# Patient Record
Sex: MALE | Race: WHITE | NOT HISPANIC OR LATINO | Employment: FULL TIME | URBAN - METROPOLITAN AREA
[De-identification: names, ages, dates, MRNs, and addresses within clinical notes are randomized per-mention and may not be internally consistent; named-entity substitution may affect disease eponyms.]

---

## 2020-07-08 ENCOUNTER — OFFICE VISIT (OUTPATIENT)
Dept: ORTHOPEDIC SURGERY | Facility: CLINIC | Age: 50
End: 2020-07-08

## 2020-07-08 VITALS — WEIGHT: 173 LBS | OXYGEN SATURATION: 98 % | HEART RATE: 60 BPM | HEIGHT: 71 IN | BODY MASS INDEX: 24.22 KG/M2

## 2020-07-08 DIAGNOSIS — M25.512 LEFT SHOULDER PAIN, UNSPECIFIED CHRONICITY: Primary | ICD-10-CM

## 2020-07-08 DIAGNOSIS — Z02.6 ENCOUNTER RELATED TO WORKER'S COMPENSATION CLAIM: ICD-10-CM

## 2020-07-08 DIAGNOSIS — S46.012A RUPTURE OF LEFT SUPRASPINATUS TENDON, INITIAL ENCOUNTER: ICD-10-CM

## 2020-07-08 PROCEDURE — 99204 OFFICE O/P NEW MOD 45 MIN: CPT | Performed by: ORTHOPAEDIC SURGERY

## 2020-07-08 NOTE — PROGRESS NOTES
[facility] [place] [facility] [place]    Subjective     Chief Complaint   Patient presents with   • Left Shoulder - Pain        HPI    Rolando Jung is a 50 y.o. male who presents with left shoulder pain.  Onset: atraumatic and gradual in nature. The issue has been ongoing for 2 month(s). Pain is a 6/10 on the pain scale. Pain is described as aching. Associated symptoms include pain, popping and grinding. The pain is worse with sleeping, working and leisure; resting improve the pain. Previous treatments have included: physical therapy.    I have reviewed the following portions of the patient's history:History of Present Illness  He has been doing physical therapy in [place] since May 7 at [facility].  He is not getting better.  His physical therapist stopped until he got the MRI.      History reviewed. No pertinent past medical history.   Past Surgical History:   Procedure Laterality Date   • APPENDECTOMY        Family History   Problem Relation Age of Onset   • Cancer Mother    • Diabetes Mother    • Cancer Father      Social History     Socioeconomic History   • Marital status:      Spouse name: Not on file   • Number of children: Not on file   • Years of education: Not on file   • Highest education level: Not on file   Tobacco Use   • Smoking status: Former Smoker     Types: Cigarettes     Start date:      Last attempt to quit:      Years since quittin.5   • Smokeless tobacco: Former User   Substance and Sexual Activity   • Alcohol use: Never     Frequency: Never   • Drug use: Never   • Sexual activity: Defer      No current outpatient medications on file prior to visit.     No current facility-administered medications on file prior to visit.       Allergies   Allergen Reactions   • Penicillins Hives        The following portions of the patient's history were reviewed and updated as appropriate: allergies, current medications, past family history, past medical history, past  "social history, past surgical history and problem list.    Review of Systems   Constitutional: Negative.    HENT: Negative.    Eyes: Negative.    Respiratory: Negative.    Cardiovascular: Negative.    Gastrointestinal: Negative.    Endocrine: Negative.    Genitourinary: Negative.    Musculoskeletal: Positive for arthralgias and joint swelling.   Skin: Negative.    Allergic/Immunologic: Negative.    Neurological: Negative.    Hematological: Negative.    Psychiatric/Behavioral: Negative.         Objective      Physical Exam  Pulse 60   Ht 180.3 cm (71\")   Wt 78.5 kg (173 lb)   SpO2 98%   BMI 24.13 kg/m²     Body mass index is 24.13 kg/m².    GENERAL APPEARANCE: awake, alert & oriented x 3, in no acute distress and well developed, well nourished  PSYCH: normal mood and affect  LUNGS:  breathing nonlabored, no wheezing  EYES: sclera anicteric, pupils equal  CARDIOVASCULAR: palpable pulses dorsalis pedis, palpable posterior tibial bilaterally. Capillary refill less than 2 seconds  INTEGUMENTARY: skin intact, no clubbing, cyanosis  NEUROLOGIC:  Normal Sensation and reflexes       Ortho Exam  Musculoskeletal   Upper Extremity   Left Shoulder     Inspection and Palpation:     Tenderness - none     Crepitus - none    Sensation is normal    Examination reveals no ecchymosis.      Strength and Tone:    Supraspinatus, Infraspinatus - 4/5    Subscapularis - 5/5    Deltoid - 5/5   Range of Motion   Right shoulder:    Internal Rotation: ROM - T7    External Rotation: AROM - 80 degrees    Elevation through flexion: AROM - 180 degrees     Abduction - 180   Left Shoulder:    Internal Rotation: ROM - T7    External Rotation: AROM - 80 degrees    Elevation through flexion: AROM - 80 degrees     Abduction - 80 degrees   Instability   Left shoulder    Sulcus sign negative    Apprehension test negative    Oliver relocation test negative    Jerk test negative   Impingement   Left shoulder    Melendez impingement test positive    Neer " impingement test positive   Functional Testing   Left shoulder    AC crossover adduction test negative    Abdominal compression test negative    Lift-off sign negative    Speed's test negative    Ijm's test negative    Horriblower's sign negative     Imaging/Studies  Imaging Results (Last 7 Days)     Procedure Component Value Units Date/Time    XR Shoulder 2+ View Left [[id number]] Resulted:  07/08/20 1544     Updated:  07/08/20 1545    Narrative:       Left Shoulder X-Ray  Indication: Pain  AP, scapular Y, and axillary lateral views    Findings: Arthritic changes at the AC joint  No fracture    No prior studies were available for comparison.          He has an MRI report from June 23 which shows a high-grade partial-thickness cuff tear possible full-thickness tear    Assessment/Plan        ICD-10-CM ICD-9-CM   1. Left shoulder pain, unspecified chronicity M25.512 719.41   2. Rupture of left supraspinatus tendon, initial encounter S46.012A 840.6   3. Encounter related to worker's compensation claim Z02.6 V70.3       Orders Placed This Encounter   Procedures   • XR Shoulder 2+ View Left   • Ambulatory Referral to Physical Therapy      He needs to continue physical therapy.  He will bring his MRI disc for me to see the pictures next time.  If he is not better at next visit that I would recommend left shoulder arthroscopy with rotator cuff repair.  His work restrictions are no lifting left arm.  Medical Decision Making  Management Options : over-the-counter medicine and physical/occupational therapy  Data/Risk: radiology tests and independent visualization of imaging, lab tests, or EMG/NCV    Frederick Cornelius MD  07/08/20  15:54         EMR Dragon/Transcription disclaimer:  Much of this encounter note is an electronic transcription of spoken language to printed text. Electronic transcription of spoken language may permit erroneous, or at times, nonsensical words or phrases to be inadvertently transcribed. Although  I have reviewed the note for such errors, some may still exist.

## 2020-07-29 ENCOUNTER — OFFICE VISIT (OUTPATIENT)
Dept: ORTHOPEDIC SURGERY | Facility: CLINIC | Age: 50
End: 2020-07-29

## 2020-07-29 VITALS — HEART RATE: 60 BPM | WEIGHT: 173.06 LBS | OXYGEN SATURATION: 98 % | HEIGHT: 71 IN | BODY MASS INDEX: 24.23 KG/M2

## 2020-07-29 DIAGNOSIS — Z02.6 ENCOUNTER RELATED TO WORKER'S COMPENSATION CLAIM: ICD-10-CM

## 2020-07-29 DIAGNOSIS — S46.012D RUPTURE OF LEFT SUPRASPINATUS TENDON, SUBSEQUENT ENCOUNTER: Primary | ICD-10-CM

## 2020-07-29 PROCEDURE — 99214 OFFICE O/P EST MOD 30 MIN: CPT | Performed by: ORTHOPAEDIC SURGERY

## 2020-07-29 NOTE — PROGRESS NOTES
[facility] [place] [facility] [place]    Subjective     Chief Complaint   Patient presents with   • Follow-up     3 week recheck - Rupture of left supraspinatus tendon        HPI  Rolando Jung is a 50 y.o. male.  He is follow-up left shoulder Worker's Comp. shoulder injury.  It was atraumatic gradual in nature.  Started May 7, 2020.  He has been in physical therapy but not getting better.  He brought his MRI from July.    History reviewed. No pertinent past medical history.   Past Surgical History:   Procedure Laterality Date   • APPENDECTOMY        Family History   Problem Relation Age of Onset   • Cancer Mother    • Diabetes Mother    • Cancer Father      Social History     Socioeconomic History   • Marital status:      Spouse name: Not on file   • Number of children: Not on file   • Years of education: Not on file   • Highest education level: Not on file   Tobacco Use   • Smoking status: Former Smoker     Types: Cigarettes     Start date:      Last attempt to quit:      Years since quittin.5   • Smokeless tobacco: Former User   Substance and Sexual Activity   • Alcohol use: Never     Frequency: Never   • Drug use: Never   • Sexual activity: Defer      No current outpatient medications on file prior to visit.     No current facility-administered medications on file prior to visit.       Allergies   Allergen Reactions   • Penicillins Hives        The following portions of the patient's history were reviewed and updated as appropriate: allergies, current medications, past family history, past medical history, past social history, past surgical history and problem list.    Review of Systems   Constitutional: Negative.    HENT: Negative.    Eyes: Negative.    Respiratory: Negative.    Cardiovascular: Negative.    Gastrointestinal: Negative.    Endocrine: Negative.    Genitourinary: Negative.    Musculoskeletal: Positive for arthralgias.   Skin: Negative.    Allergic/Immunologic: Negative.   "  Neurological: Negative.    Hematological: Negative.    Psychiatric/Behavioral: Negative.         Objective      Physical Exam  Pulse 60   Ht 180.3 cm (70.98\")   Wt 78.5 kg (173 lb 1 oz)   SpO2 98%   BMI 24.15 kg/m²     Body mass index is 24.15 kg/m².    GENERAL APPEARANCE: awake, alert & oriented x 3, in no acute distress and well developed, well nourished  PSYCH: normal mood and affect  LUNGS:  breathing nonlabored, no wheezing  Left shoulder has limited motion with weak rotator cuff and positive impingement.  Imaging/Studies  Imaging Results (Last 7 Days)     ** No results found for the last 168 hours. **      I viewed his MRI which shows a full-thickness rotator cuff tear    Assessment/Plan        ICD-10-CM ICD-9-CM   1. Rupture of left supraspinatus tendon, subsequent encounter S46.012D V58.89     840.6   2. Encounter related to worker's compensation claim Z02.6 V70.3     The plan is for left shoulder arthroscopy with rotator cuff repair.  He has a full-thickness deficit.  He has weakness and lack of function.  He is working full duty.Treatment options and alternatives were discussed with patient.  Surgical risks include but are not limited to pain, bleeding, infection, failure to relieve symptoms, need for further procedures, recurrence of symptoms, damage to healthy adjacent structures, hardware loosening/failure, stiffness, weakness, scar, blood clots/DVT/PE, loss of limb or life. We also discussed the postoperative protocol and expected outcome although no guarantees are possible with surgery. All questions were answered; the patient would like to proceed with surgical intervention.  Medical Decision Making  Management Options : over-the-counter medicine and major surgery with risk factors  Data/Risk: independent visualization of imaging, lab tests, or EMG/NCV    Frederick Cornelius MD  07/29/20  15:35         EMR Dragon/Transcription disclaimer:  Much of this encounter note is an electronic " transcription of spoken language to printed text. Electronic transcription of spoken language may permit erroneous, or at times, nonsensical words or phrases to be inadvertently transcribed. Although I have reviewed the note for such errors, some may still exist.

## 2020-07-30 ENCOUNTER — TELEPHONE (OUTPATIENT)
Dept: ORTHOPEDIC SURGERY | Facility: CLINIC | Age: 50
End: 2020-07-30

## 2020-07-30 NOTE — TELEPHONE ENCOUNTER
MARY ELLEN IS [employer]  FOR [employer]. SHE NEEDS IN WRITING IF PATIENT IS TO BE AT WORK ON LIGHT DUTY UNTIL HIS SURGERY OR IF ANY RESTRICTION? LETTER CAN BE FAXED @ [phone]

## 2020-07-31 NOTE — TELEPHONE ENCOUNTER
I will forward this message back to Shayla to send letter.   He may work light duty with no use left arm.  Please fax a letter to [phone]

## 2020-08-11 ENCOUNTER — TELEPHONE (OUTPATIENT)
Dept: ORTHOPEDIC SURGERY | Facility: CLINIC | Age: 50
End: 2020-08-11

## 2020-08-11 NOTE — TELEPHONE ENCOUNTER
RETURNED PATIENT'S CALL AGAIN TODAY REGARDING [employer] NOT SENDING US AN AUTH YET FOR SURGERY, NO ANSWER, LVM AGAIN EXPLAINING WE CAN NOT SCHEDULE UNTIL WRITTEN AUTH IS RECEIVED.

## 2020-08-11 NOTE — TELEPHONE ENCOUNTER
CALLED PATIENT TO ADVISE THAT [facility] HAS DENIED SURGERY REQUEST, NO ANSWER, LVM TO RETURN MY CALL.

## 2020-08-12 ENCOUNTER — TELEPHONE (OUTPATIENT)
Dept: ORTHOPEDIC SURGERY | Facility: CLINIC | Age: 50
End: 2020-08-12

## 2020-08-12 NOTE — TELEPHONE ENCOUNTER
CALLED PATIENT TO ADVISE THAT AUTHORIZATION FOR L SHOULDER SURGERY HAS BEEN RECEIVED, NO ANSWER, LVM TO RETURN MY CALL TO SCHEDULE.

## 2020-08-16 ENCOUNTER — APPOINTMENT (OUTPATIENT)
Dept: PREADMISSION TESTING | Facility: HOSPITAL | Age: 50
End: 2020-08-16

## 2020-08-16 PROCEDURE — U0002 COVID-19 LAB TEST NON-CDC: HCPCS

## 2020-08-16 PROCEDURE — U0004 COV-19 TEST NON-CDC HGH THRU: HCPCS

## 2020-08-16 PROCEDURE — C9803 HOPD COVID-19 SPEC COLLECT: HCPCS

## 2020-08-17 LAB
REF LAB TEST METHOD: NORMAL
SARS-COV-2 RNA RESP QL NAA+PROBE: NOT DETECTED

## 2020-08-18 ENCOUNTER — OUTSIDE FACILITY SERVICE (OUTPATIENT)
Dept: ORTHOPEDIC SURGERY | Facility: CLINIC | Age: 50
End: 2020-08-18

## 2020-08-18 DIAGNOSIS — Z98.890 S/P ROTATOR CUFF SURGERY: Primary | ICD-10-CM

## 2020-08-18 PROCEDURE — 29826 SHO ARTHRS SRG DECOMPRESSION: CPT | Performed by: ORTHOPAEDIC SURGERY

## 2020-08-18 PROCEDURE — 29827 SHO ARTHRS SRG RT8TR CUF RPR: CPT | Performed by: ORTHOPAEDIC SURGERY

## 2020-08-18 RX ORDER — ONDANSETRON 4 MG/1
4 TABLET, FILM COATED ORAL EVERY 8 HOURS PRN
Qty: 10 TABLET | Refills: 1 | Status: SHIPPED | OUTPATIENT
Start: 2020-08-18 | End: 2020-09-14

## 2020-08-18 RX ORDER — OXYCODONE HYDROCHLORIDE AND ACETAMINOPHEN 5; 325 MG/1; MG/1
1 TABLET ORAL EVERY 6 HOURS PRN
Qty: 20 TABLET | Refills: 0 | Status: SHIPPED | OUTPATIENT
Start: 2020-08-18 | End: 2020-09-14

## 2020-08-24 ENCOUNTER — OFFICE VISIT (OUTPATIENT)
Dept: ORTHOPEDIC SURGERY | Facility: CLINIC | Age: 50
End: 2020-08-24

## 2020-08-24 DIAGNOSIS — Z98.890 S/P ROTATOR CUFF SURGERY: Primary | ICD-10-CM

## 2020-08-24 DIAGNOSIS — Z02.6 ENCOUNTER RELATED TO WORKER'S COMPENSATION CLAIM: ICD-10-CM

## 2020-08-24 PROCEDURE — 99024 POSTOP FOLLOW-UP VISIT: CPT | Performed by: ORTHOPAEDIC SURGERY

## 2020-08-24 RX ORDER — IBUPROFEN 400 MG/1
400 TABLET ORAL AS NEEDED
COMMUNITY
End: 2020-08-24

## 2020-08-24 RX ORDER — IBUPROFEN 800 MG/1
800 TABLET ORAL 3 TIMES DAILY
Qty: 270 TABLET | Refills: 3 | Status: SHIPPED | OUTPATIENT
Start: 2020-08-24 | End: 2021-02-26

## 2020-08-24 NOTE — PROGRESS NOTES
Chief Complaint   Patient presents with   • Post-op     6 days post left shoulder scope, RCR, acromioplasty 8/18/20   Answers for HPI/ROS submitted by the patient on 8/17/2020   What is the primary reason for your visit?: Other  Please describe your symptoms.: Follow up after surgery  Have you had these symptoms before?: No  How long have you been having these symptoms?: 5-7 days          HPI  He is doing well.    There were no vitals filed for this visit.      Physical Exam:    Portals are good.  Neurologically intact.        ICD-10-CM ICD-9-CM   1. S/P rotator cuff surgery Z98.890 V45.89   2. Encounter related to worker's compensation claim Z02.6 V70.3     He is doing well follow-up left shoulder cuff repair plan of the work restrictions no use left arm or off work.  Follow-up in 3 weeks.  I ordered ibuprofen for him.

## 2020-09-14 ENCOUNTER — OFFICE VISIT (OUTPATIENT)
Dept: ORTHOPEDIC SURGERY | Facility: CLINIC | Age: 50
End: 2020-09-14

## 2020-09-14 DIAGNOSIS — Z02.6 ENCOUNTER RELATED TO WORKER'S COMPENSATION CLAIM: ICD-10-CM

## 2020-09-14 DIAGNOSIS — Z98.890 S/P ROTATOR CUFF SURGERY: Primary | ICD-10-CM

## 2020-09-14 PROCEDURE — 99024 POSTOP FOLLOW-UP VISIT: CPT | Performed by: ORTHOPAEDIC SURGERY

## 2020-09-14 NOTE — PROGRESS NOTES
Chief Complaint   Patient presents with   • Post-op     3 week follow up; 4 weeks status post left shoulder scope, RCR, acromioplasty 8/18/20           HPI  Is doing well with no complaints.  He lives in [place].      There were no vitals filed for this visit.      Physical Exam:  His portals are good.        ICD-10-CM ICD-9-CM   1. S/P rotator cuff surgery  Z98.890 V45.89   2. Encounter related to worker's compensation claim  Z02.6 V70.3       Orders Placed This Encounter   Procedures   • Ambulatory Referral to Physical Therapy     He will go to physical therapy.  Work restrictions no use left arm.  Follow-up in 1 month.  Anticipate return to full duty in 4 months.

## 2020-10-14 ENCOUNTER — OFFICE VISIT (OUTPATIENT)
Dept: ORTHOPEDIC SURGERY | Facility: CLINIC | Age: 50
End: 2020-10-14

## 2020-10-14 DIAGNOSIS — Z98.890 S/P ROTATOR CUFF SURGERY: Primary | ICD-10-CM

## 2020-10-14 DIAGNOSIS — Z02.6 ENCOUNTER RELATED TO WORKER'S COMPENSATION CLAIM: ICD-10-CM

## 2020-10-14 PROCEDURE — 99024 POSTOP FOLLOW-UP VISIT: CPT | Performed by: ORTHOPAEDIC SURGERY

## 2020-10-14 NOTE — PROGRESS NOTES
Chief Complaint   Patient presents with   • Post-op     1 month follow up; 8 weeks status post left shoulder scope, RCR, acromioplasty 8/18/20           HPI  He says he is doing better.      There were no vitals filed for this visit.      Physical Exam:  He can lift about 90 degrees actively passively he goes further than that.  He is very weak.        ICD-10-CM ICD-9-CM   1. S/P rotator cuff surgery  Z98.890 V45.89   2. Encounter related to worker's compensation claim  Z02.6 V70.3       Orders Placed This Encounter   Procedures   • Ambulatory Referral to Physical Therapy     He will continue physical therapy.  Follow-up in 1 month.  Work restrictions are no use left arm.

## 2020-10-15 ENCOUNTER — TELEPHONE (OUTPATIENT)
Dept: ORTHOPEDIC SURGERY | Facility: CLINIC | Age: 50
End: 2020-10-15

## 2020-10-15 NOTE — TELEPHONE ENCOUNTER
[facility] WAS CALLING TO TRY AND GET MORE VISITS FOR THIS PATIENT THROUGH HIS [employer]. HOWEVER, THEY ARE NEEDING A PHYSICAL SIGNATURE NOT AN ELECTRONIC SIGNATURE. CAN WE HAVE AN UPDATED ORDER FAXED TO THEM [phone].

## 2020-10-16 NOTE — TELEPHONE ENCOUNTER
I printed off the PT script that was already in patients chart and had Dr. Cornelius sign it and I faxed it back to [facility] at [phone] with success. I placed signed copy in scan stack to be scanned in patients chart.

## 2020-11-12 ENCOUNTER — TELEPHONE (OUTPATIENT)
Dept: ORTHOPEDIC SURGERY | Facility: CLINIC | Age: 50
End: 2020-11-12

## 2020-11-12 DIAGNOSIS — S46.012D RUPTURE OF LEFT SUPRASPINATUS TENDON, SUBSEQUENT ENCOUNTER: ICD-10-CM

## 2020-11-12 DIAGNOSIS — Z98.890 S/P ROTATOR CUFF SURGERY: Primary | ICD-10-CM

## 2020-11-23 ENCOUNTER — OFFICE VISIT (OUTPATIENT)
Dept: ORTHOPEDIC SURGERY | Facility: CLINIC | Age: 50
End: 2020-11-23

## 2020-11-23 VITALS — HEIGHT: 71 IN | OXYGEN SATURATION: 99 % | HEART RATE: 63 BPM | BODY MASS INDEX: 23.07 KG/M2 | WEIGHT: 164.8 LBS

## 2020-11-23 DIAGNOSIS — Z02.6 ENCOUNTER RELATED TO WORKER'S COMPENSATION CLAIM: ICD-10-CM

## 2020-11-23 DIAGNOSIS — R29.898 SHOULDER WEAKNESS: ICD-10-CM

## 2020-11-23 DIAGNOSIS — Z98.890 S/P ROTATOR CUFF SURGERY: Primary | ICD-10-CM

## 2020-11-23 PROCEDURE — 99212 OFFICE O/P EST SF 10 MIN: CPT | Performed by: ORTHOPAEDIC SURGERY

## 2020-11-23 NOTE — PROGRESS NOTES
[facility] [place] [facility] [place]    Subjective     Chief Complaint   Patient presents with   • Follow-up     3 months status post left shoulder scope, RCR, acromioplasty 20        HPI  Rolando Jung is a 50 y.o. male.  He is doing well with no new complaints.  He says he is getting better.    History reviewed. No pertinent past medical history.   Past Surgical History:   Procedure Laterality Date   • APPENDECTOMY     • SHOULDER SURGERY Left 2020    RCR, acromioplasty      Family History   Problem Relation Age of Onset   • Cancer Mother    • Diabetes Mother    • Cancer Father      Social History     Socioeconomic History   • Marital status:      Spouse name: Not on file   • Number of children: Not on file   • Years of education: Not on file   • Highest education level: Not on file   Tobacco Use   • Smoking status: Former Smoker     Types: Cigarettes     Start date:      Quit date:      Years since quittin.9   • Smokeless tobacco: Former User   Substance and Sexual Activity   • Alcohol use: Never     Frequency: Never   • Drug use: Never   • Sexual activity: Defer      Current Outpatient Medications on File Prior to Visit   Medication Sig Dispense Refill   • ibuprofen (ADVIL,MOTRIN) 800 MG tablet Take 1 tablet by mouth 3 (Three) Times a Day. 270 tablet 3     No current facility-administered medications on file prior to visit.       Allergies   Allergen Reactions   • Penicillins Hives        The following portions of the patient's history were reviewed and updated as appropriate: allergies, current medications, past family history, past medical history, past social history, past surgical history and problem list.    Review of Systems   Constitutional: Negative.    HENT: Negative.    Eyes: Negative.    Respiratory: Negative.    Cardiovascular: Negative.    Gastrointestinal: Negative.    Endocrine: Negative.    Genitourinary: Negative.    Musculoskeletal: Positive for arthralgias.  "  Skin: Negative.    Allergic/Immunologic: Negative.    Neurological: Negative.    Hematological: Negative.    Psychiatric/Behavioral: Negative.         Objective      Physical Exam  Pulse 63   Ht 180.3 cm (70.98\")   Wt 74.8 kg (164 lb 12.8 oz)   SpO2 99%   BMI 23.00 kg/m²     Body mass index is 23 kg/m².    GENERAL APPEARANCE: awake, alert & oriented x 3, in no acute distress and well developed, well nourished  PSYCH: normal mood and affect  LUNGS:  breathing nonlabored, no wheezing  Left shoulder lacks full flexion and abduction.  Strength is 4 out of 5.  Imaging/Studies  Imaging Results (Last 7 Days)     ** No results found for the last 168 hours. **          Assessment/Plan        ICD-10-CM ICD-9-CM   1. S/P rotator cuff surgery  Z98.890 V45.89   2. Encounter related to worker's compensation claim  Z02.6 V70.3   3. Shoulder weakness  R29.898 719.61       Orders Placed This Encounter   Procedures   • Ambulatory Referral to Physical Therapy      He will continue physical therapy.  Work restrictions no lifting left arm more than 5 pounds.  Follow-up in 1 month.  Medical Decision Making  Management Options : over-the-counter medicine and physical/occupational therapy    Frederick Cornelius MD  11/23/20  08:12 EST         EMR Dragon/Transcription disclaimer:  Much of this encounter note is an electronic transcription of spoken language to printed text. Electronic transcription of spoken language may permit erroneous, or at times, nonsensical words or phrases to be inadvertently transcribed. Although I have reviewed the note for such errors, some may still exist.      "

## 2020-12-23 ENCOUNTER — TELEPHONE (OUTPATIENT)
Dept: ORTHOPEDIC SURGERY | Facility: CLINIC | Age: 50
End: 2020-12-23

## 2020-12-23 ENCOUNTER — OFFICE VISIT (OUTPATIENT)
Dept: ORTHOPEDIC SURGERY | Facility: CLINIC | Age: 50
End: 2020-12-23

## 2020-12-23 VITALS — WEIGHT: 165 LBS | HEART RATE: 61 BPM | HEIGHT: 71 IN | BODY MASS INDEX: 23.1 KG/M2 | OXYGEN SATURATION: 99 %

## 2020-12-23 DIAGNOSIS — Z98.890 S/P ROTATOR CUFF SURGERY: Primary | ICD-10-CM

## 2020-12-23 DIAGNOSIS — Z02.6 ENCOUNTER RELATED TO WORKER'S COMPENSATION CLAIM: ICD-10-CM

## 2020-12-23 DIAGNOSIS — R29.898 SHOULDER WEAKNESS: ICD-10-CM

## 2020-12-23 PROCEDURE — 99212 OFFICE O/P EST SF 10 MIN: CPT | Performed by: ORTHOPAEDIC SURGERY

## 2020-12-23 NOTE — TELEPHONE ENCOUNTER
Called patient's  (Michelle) to let her know that I will fax over TCW's office note for today and work status; I also let her know based on TCW's note the following info:    He will continue physical therapy.  Increased his weight limit to no lifting more than 15 pounds.  Follow-up in 1 month.  Anticipate MMI at 6 months.    Told her to call back with any other questions.    Leonila

## 2020-12-23 NOTE — TELEPHONE ENCOUNTER
Provider: MARY MAYES  Caller: MARY ELLEN-  Relationship to Patient:     Phone Number: [phone]    Reason for Call: MARY ELLEN--  REQUESTING STATUS OF PATIENTS APPT- AND REQUESTING OFFICE NOTES AND WORK STATUS TO BE FAXED TO HER ON PATIENT-FAX NUMBER [phone]    When was the patient last seen: 12/23/20

## 2020-12-23 NOTE — PROGRESS NOTES
[facility] [place] [facility] [place]    Subjective     Chief Complaint   Patient presents with   • Follow-up     1 month follow up; 4 months status post left shoulder scope, RCR, acromioplasty 20        HPI  Rolando Jung is a 50 y.o. male.  He is 4 months out from his left shoulder cuff repair.  He thinks is getting better.  He goes to [facility] physical therapy.    No past medical history on file.   Past Surgical History:   Procedure Laterality Date   • APPENDECTOMY     • SHOULDER SURGERY Left 2020    RCR, acromioplasty      Family History   Problem Relation Age of Onset   • Cancer Mother    • Diabetes Mother    • Cancer Father      Social History     Socioeconomic History   • Marital status:      Spouse name: Not on file   • Number of children: Not on file   • Years of education: Not on file   • Highest education level: Not on file   Tobacco Use   • Smoking status: Former Smoker     Types: Cigarettes     Start date:      Quit date:      Years since quittin.9   • Smokeless tobacco: Former User   Substance and Sexual Activity   • Alcohol use: Never     Frequency: Never   • Drug use: Never   • Sexual activity: Defer      Current Outpatient Medications on File Prior to Visit   Medication Sig Dispense Refill   • ibuprofen (ADVIL,MOTRIN) 800 MG tablet Take 1 tablet by mouth 3 (Three) Times a Day. 270 tablet 3     No current facility-administered medications on file prior to visit.       Allergies   Allergen Reactions   • Penicillins Hives        The following portions of the patient's history were reviewed and updated as appropriate: allergies, current medications, past family history, past medical history, past social history, past surgical history and problem list.    Review of Systems   Constitutional: Negative.    HENT: Negative.    Eyes: Negative.    Respiratory: Negative.    Cardiovascular: Negative.    Gastrointestinal: Negative.    Endocrine: Negative.    Genitourinary:  "Negative.    Musculoskeletal: Positive for arthralgias.   Skin: Negative.    Allergic/Immunologic: Negative.    Neurological: Negative.    Hematological: Negative.    Psychiatric/Behavioral: Negative.         Objective      Physical Exam  Pulse 61   Ht 180.3 cm (70.98\")   Wt 74.8 kg (165 lb)   SpO2 99%   BMI 23.02 kg/m²     Body mass index is 23.02 kg/m².    GENERAL APPEARANCE: awake, alert & oriented x 3, in no acute distress and well developed, well nourished  PSYCH: normal mood and affect  LUNGS:  breathing nonlabored, no wheezing  Left shoulder has improved motion and strength.  He still lacks full flexion and abduction.  Strength is 4-5    Imaging/Studies  Imaging Results (Last 7 Days)     ** No results found for the last 168 hours. **          Assessment/Plan        ICD-10-CM ICD-9-CM   1. S/P rotator cuff surgery  Z98.890 V45.89   2. Encounter related to worker's compensation claim  Z02.6 V70.3   3. Shoulder weakness  R29.898 719.61       Orders Placed This Encounter   Procedures   • Ambulatory Referral to Physical Therapy      He will continue physical therapy.  Increased his weight limit to no lifting more than 15 pounds.  Follow-up in 1 month.  Anticipate MMI at 6 months.  Medical Decision Making  Management Options : over-the-counter medicine and physical/occupational therapy    Frederick Cornelius MD  12/23/20  09:02 EST         EMR Dragon/Transcription disclaimer:  Much of this encounter note is an electronic transcription of spoken language to printed text. Electronic transcription of spoken language may permit erroneous, or at times, nonsensical words or phrases to be inadvertently transcribed. Although I have reviewed the note for such errors, some may still exist.      "

## 2021-01-25 ENCOUNTER — OFFICE VISIT (OUTPATIENT)
Dept: ORTHOPEDIC SURGERY | Facility: CLINIC | Age: 51
End: 2021-01-25

## 2021-01-25 VITALS — BODY MASS INDEX: 23.24 KG/M2 | WEIGHT: 166 LBS | HEART RATE: 74 BPM | HEIGHT: 71 IN | OXYGEN SATURATION: 96 %

## 2021-01-25 DIAGNOSIS — Z02.6 ENCOUNTER RELATED TO WORKER'S COMPENSATION CLAIM: ICD-10-CM

## 2021-01-25 DIAGNOSIS — S46.012D RUPTURE OF LEFT SUPRASPINATUS TENDON, SUBSEQUENT ENCOUNTER: ICD-10-CM

## 2021-01-25 DIAGNOSIS — Z98.890 S/P ROTATOR CUFF SURGERY: Primary | ICD-10-CM

## 2021-01-25 DIAGNOSIS — R29.898 SHOULDER WEAKNESS: ICD-10-CM

## 2021-01-25 PROCEDURE — 99212 OFFICE O/P EST SF 10 MIN: CPT | Performed by: ORTHOPAEDIC SURGERY

## 2021-01-25 NOTE — PROGRESS NOTES
"  .Review of Systems   Constitutional: Negative.  Negative for chills, fatigue and fever.   HENT: Negative.  Negative for congestion and dental problem.    Eyes: Negative.  Negative for blurred vision.   Respiratory: Negative.  Negative for shortness of breath.    Cardiovascular: Negative.  Negative for leg swelling.   Gastrointestinal: Negative.  Negative for abdominal pain.   Endocrine: Negative.  Negative for polyuria.   Genitourinary: Negative.  Negative for difficulty urinating.   Musculoskeletal: Positive for arthralgias.   Skin: Negative.    Allergic/Immunologic: Negative.    Neurological: Negative.    Hematological: Negative.  Negative for adenopathy.   Psychiatric/Behavioral: Negative.  Negative for behavioral problems.       [facility]    Subjective     CC: Follow-up (1 month follow up; 5 months status post left shoulder scope, RCR, acromioplasty 8/18/20)      NAVEEN Jung is a 50 y.o. male. ***       ROS:    Constiutional:Pt denies fever, chills, nausea, or vomiting.  MSK:as above    Objective      Past Medical History  History reviewed. No pertinent past medical history.      Physical Exam  Pulse 74   Ht 180.3 cm (70.98\")   Wt 75.3 kg (166 lb)   SpO2 96%   BMI 23.16 kg/m²     Body mass index is 23.16 kg/m².    Patient is well nourished and well developed.        Ortho Exam  ***    Imaging/Labs/EMG Reviewed:  Imaging Results (Last 24 Hours)     ** No results found for the last 24 hours. **          Assessment:  No diagnosis found.    Plan:  1. Recommend over the counter anti-inflammatories for pain and/or swelling  2. ***      Medical Decision Making  Management Options : {[facility] - Management Options:95680}  Data/Risk: {[facility] - Data/Risk:71180}      Frederick Cornelius M.D., FAAOS  Orthopedic Surgeon  Fellowship Trained Sports Medicine  [facility]  Orthopedics and Sports Medicine  [address], [address]  [place] [address]  "

## 2021-01-25 NOTE — PROGRESS NOTES
"    [facility]    Subjective     CC: Follow-up (1 month follow up; 5 months status post left shoulder scope, RCR, acromioplasty 8/18/20)      HPI    Rolando Jung is a 50 y.o. male.  He is doing well.  He goes to [facility] physical therapy.      ROS:    Constiutional:Pt denies fever, chills, nausea, or vomiting.  MSK:as above    Objective      Past Medical History  History reviewed. No pertinent past medical history.      Physical Exam  Pulse 74   Ht 180.3 cm (70.98\")   Wt 75.3 kg (166 lb)   SpO2 96%   BMI 23.16 kg/m²     Body mass index is 23.16 kg/m².    Patient is well nourished and well developed.        Ortho Exam  He lacks 10 degrees of full flexion and abduction.  Strength is 4+ out of 5 the rotator cuff.    Imaging/Labs/EMG Reviewed:  Imaging Results (Last 24 Hours)     ** No results found for the last 24 hours. **          Assessment:  1. S/P rotator cuff surgery    2. Encounter related to worker's compensation claim    3. Shoulder weakness    4. Rupture of left supraspinatus tendon, subsequent encounter        Plan:  1. Recommend over the counter anti-inflammatories for pain and/or swelling  2. He will continue physical therapy.  Follow-up in 1 month.  Work restrictions are no lift over 15 pounds.      Medical Decision Making  Management Options : over-the-counter medicine and physical/occupational therapy      Frederick Cornelius M.D., FAAOS  Orthopedic Surgeon  Fellowship Trained Sports Medicine  [facility]  Orthopedics and Sports Medicine  [address] [place] [address], [address]  [place] [address]  "

## 2021-02-26 ENCOUNTER — OFFICE VISIT (OUTPATIENT)
Dept: ORTHOPEDIC SURGERY | Facility: CLINIC | Age: 51
End: 2021-02-26

## 2021-02-26 VITALS — HEART RATE: 59 BPM | HEIGHT: 71 IN | OXYGEN SATURATION: 98 % | BODY MASS INDEX: 23.32 KG/M2 | WEIGHT: 166.6 LBS

## 2021-02-26 DIAGNOSIS — Z98.890 S/P ROTATOR CUFF SURGERY: Primary | ICD-10-CM

## 2021-02-26 DIAGNOSIS — Z02.6 ENCOUNTER RELATED TO WORKER'S COMPENSATION CLAIM: ICD-10-CM

## 2021-02-26 PROCEDURE — 99213 OFFICE O/P EST LOW 20 MIN: CPT | Performed by: ORTHOPAEDIC SURGERY

## 2021-02-26 RX ORDER — IBUPROFEN 800 MG/1
800 TABLET ORAL 3 TIMES DAILY
Qty: 270 TABLET | Refills: 3 | Status: SHIPPED | OUTPATIENT
Start: 2021-02-26

## 2021-02-26 NOTE — PROGRESS NOTES
"      [facility]    Subjective     CC: Follow-up (1 month follow up; 6 months status post left shoulder scope, RCR, acromioplasty 8/18/20))      NAVEEN Jung is a 50 y.o. male.  He is doing well.  He believes he return to work full duty.    Review of Systems   Constitutional: Negative.  Negative for chills, fatigue and fever.   HENT: Negative.  Negative for congestion and dental problem.    Eyes: Negative.  Negative for blurred vision.   Respiratory: Negative.  Negative for shortness of breath.    Cardiovascular: Negative.  Negative for leg swelling.   Gastrointestinal: Negative.  Negative for abdominal pain.   Endocrine: Negative.  Negative for polyuria.   Genitourinary: Negative.  Negative for difficulty urinating.   Musculoskeletal: Positive for arthralgias.   Skin: Negative.    Allergic/Immunologic: Negative.    Neurological: Negative.    Hematological: Negative.  Negative for adenopathy.   Psychiatric/Behavioral: Negative.  Negative for behavioral problems.       ROS:    Constiutional:Pt denies fever, chills, nausea, or vomiting.  MSK:as above      Objective      Past Medical History  History reviewed. No pertinent past medical history.      Physical Exam  Pulse 59   Ht 180.3 cm (70.98\")   Wt 75.6 kg (166 lb 9.6 oz)   SpO2 98%   BMI 23.25 kg/m²     Body mass index is 23.25 kg/m².    Patient is well nourished and well developed.        Ortho Exam  Left shoulder has full motion full-strength.    Imaging/Labs/EMG Reviewed:  Imaging Results (Last 24 Hours)     ** No results found for the last 24 hours. **          Assessment:  1. S/P rotator cuff surgery    2. Encounter related to worker's compensation claim        Plan:  1. Recommend over the counter anti-inflammatories for pain and/or swelling  2. He is doing well.  He occasionally has some inflammation and would like a refill of ibuprofen.  I refilled his ibuprofen.  3. He is at [employer]  He may work full duty as of tomorrow " 2/27/21    Follow Up:   Return if symptoms worsen or fail to improve.      Medical Decision Making  Management Options : Low - OTC Drugs        Frederick Cornelius M.D., FAAOS  Orthopedic Surgeon  Fellowship Trained Sports Medicine  [facility]  Orthopedics and Sports Medicine  [address] [place] [address], [address]  [place] [address]

## 2021-03-01 ENCOUNTER — TELEPHONE (OUTPATIENT)
Dept: ORTHOPEDIC SURGERY | Facility: CLINIC | Age: 51
End: 2021-03-01

## 2021-03-01 NOTE — TELEPHONE ENCOUNTER
Caller: MARY ELLEN    Relationship: [employer]    Best call back number: [phone]    What form or medical record are you requesting: PN 2/26, AND WORK STATUS    Who is requesting this form or medical record from you: ADELE    How would you like to receive the form or medical records (pick-up, mail, fax): FAX  If fax, what is the fax number: [phone]

## 2021-03-09 ENCOUNTER — TELEPHONE (OUTPATIENT)
Dept: ORTHOPEDIC SURGERY | Facility: CLINIC | Age: 51
End: 2021-03-09

## 2021-03-09 NOTE — TELEPHONE ENCOUNTER
I spoke with Michelle and she is inquiring if he has any impairment or is at [facility] ?    Can we type and put in letter to ?    Joanna Johnson

## 2021-03-09 NOTE — TELEPHONE ENCOUNTER
Caller: MARY ELLEN CHAMORRO    Relationship: NURSE     Best call back number: [phone]    What form or medical record are you requesting: PT IMPAIRMENT RATING/[facility]    Who is requesting this form or medical record from you: [employer]    How would you like to receive the form or medical records (pick-up, mail, fax): FAX  If fax, what is the fax number: [phone]    Timeframe paperwork needed: ASAP

## 2021-03-10 NOTE — TELEPHONE ENCOUNTER
From Dr. Cornelius:    The patient is at [facility].  In the past the use request form impairment ratings went to [facility] and there was a charge and wants the request was paid for, [facility] would give these to me and I would fill them out          I called and spoke with Yudy and asked her to fax over form/request.    Joanna Johnson